# Patient Record
Sex: MALE | Race: WHITE | NOT HISPANIC OR LATINO
[De-identification: names, ages, dates, MRNs, and addresses within clinical notes are randomized per-mention and may not be internally consistent; named-entity substitution may affect disease eponyms.]

---

## 2018-07-06 ENCOUNTER — APPOINTMENT (OUTPATIENT)
Dept: PLASTIC SURGERY | Facility: CLINIC | Age: 43
End: 2018-07-06
Payer: COMMERCIAL

## 2018-07-06 VITALS
OXYGEN SATURATION: 98 % | DIASTOLIC BLOOD PRESSURE: 59 MMHG | SYSTOLIC BLOOD PRESSURE: 99 MMHG | TEMPERATURE: 97.9 F | BODY MASS INDEX: 34.65 KG/M2 | WEIGHT: 242 LBS | HEART RATE: 62 BPM | HEIGHT: 70 IN | RESPIRATION RATE: 20 BRPM

## 2018-07-06 DIAGNOSIS — F17.200 NICOTINE DEPENDENCE, UNSPECIFIED, UNCOMPLICATED: ICD-10-CM

## 2018-07-06 PROCEDURE — 99204 OFFICE O/P NEW MOD 45 MIN: CPT

## 2018-08-28 ENCOUNTER — APPOINTMENT (OUTPATIENT)
Dept: PLASTIC SURGERY | Facility: HOSPITAL | Age: 43
End: 2018-08-28
Payer: COMMERCIAL

## 2018-08-28 PROCEDURE — 15830 EXC EXCESSIVE SKIN ABDOMEN: CPT

## 2018-09-05 ENCOUNTER — APPOINTMENT (OUTPATIENT)
Dept: PLASTIC SURGERY | Facility: CLINIC | Age: 43
End: 2018-09-05
Payer: COMMERCIAL

## 2018-09-05 VITALS
RESPIRATION RATE: 20 BRPM | TEMPERATURE: 98.9 F | WEIGHT: 227 LBS | HEIGHT: 70 IN | OXYGEN SATURATION: 98 % | BODY MASS INDEX: 32.5 KG/M2 | DIASTOLIC BLOOD PRESSURE: 64 MMHG | SYSTOLIC BLOOD PRESSURE: 100 MMHG | HEART RATE: 93 BPM

## 2018-09-05 DIAGNOSIS — Z09 ENCOUNTER FOR FOLLOW-UP EXAMINATION AFTER COMPLETED TREATMENT FOR CONDITIONS OTHER THAN MALIGNANT NEOPLASM: ICD-10-CM

## 2018-09-05 PROCEDURE — 99024 POSTOP FOLLOW-UP VISIT: CPT

## 2018-09-05 RX ORDER — OXYCODONE HYDROCHLORIDE 5 MG/1
5 CAPSULE ORAL
Qty: 30 | Refills: 0 | Status: ACTIVE | COMMUNITY
Start: 2018-09-05 | End: 1900-01-01

## 2018-09-10 PROBLEM — Z09 POSTOP CHECK: Status: ACTIVE | Noted: 2018-09-10

## 2018-09-12 ENCOUNTER — APPOINTMENT (OUTPATIENT)
Dept: PLASTIC SURGERY | Facility: CLINIC | Age: 43
End: 2018-09-12
Payer: COMMERCIAL

## 2018-09-12 VITALS
SYSTOLIC BLOOD PRESSURE: 111 MMHG | TEMPERATURE: 98 F | OXYGEN SATURATION: 96 % | HEART RATE: 74 BPM | RESPIRATION RATE: 20 BRPM | DIASTOLIC BLOOD PRESSURE: 70 MMHG

## 2018-09-12 VITALS — TEMPERATURE: 98.2 F

## 2018-09-12 PROCEDURE — 99024 POSTOP FOLLOW-UP VISIT: CPT

## 2018-09-12 RX ORDER — OXYCODONE HYDROCHLORIDE 5 MG/1
5 CAPSULE ORAL EVERY 4 HOURS
Qty: 50 | Refills: 0 | Status: ACTIVE | COMMUNITY
Start: 2018-09-12 | End: 1900-01-01

## 2018-09-12 RX ORDER — SILVER SULFADIAZINE 10 MG/G
1 CREAM TOPICAL DAILY
Qty: 1 | Refills: 0 | Status: ACTIVE | COMMUNITY
Start: 2018-09-12 | End: 1900-01-01

## 2018-09-20 ENCOUNTER — APPOINTMENT (OUTPATIENT)
Dept: PLASTIC SURGERY | Facility: CLINIC | Age: 43
End: 2018-09-20
Payer: COMMERCIAL

## 2018-09-20 VITALS — HEART RATE: 63 BPM | TEMPERATURE: 98.5 F | RESPIRATION RATE: 20 BRPM | OXYGEN SATURATION: 97 %

## 2018-09-20 PROCEDURE — 99024 POSTOP FOLLOW-UP VISIT: CPT

## 2018-09-27 ENCOUNTER — APPOINTMENT (OUTPATIENT)
Dept: PLASTIC SURGERY | Facility: CLINIC | Age: 43
End: 2018-09-27
Payer: COMMERCIAL

## 2018-09-27 VITALS — TEMPERATURE: 98.4 F

## 2018-09-27 PROCEDURE — 99024 POSTOP FOLLOW-UP VISIT: CPT

## 2018-09-27 RX ORDER — SILVER SULFADIAZINE 10 MG/G
1 CREAM TOPICAL TWICE DAILY
Qty: 1 | Refills: 2 | Status: ACTIVE | COMMUNITY
Start: 2018-09-27 | End: 1900-01-01

## 2018-10-17 ENCOUNTER — APPOINTMENT (OUTPATIENT)
Dept: PLASTIC SURGERY | Facility: CLINIC | Age: 43
End: 2018-10-17

## 2019-01-23 ENCOUNTER — APPOINTMENT (OUTPATIENT)
Dept: PLASTIC SURGERY | Facility: CLINIC | Age: 44
End: 2019-01-23

## 2019-04-03 ENCOUNTER — APPOINTMENT (OUTPATIENT)
Dept: PLASTIC SURGERY | Facility: CLINIC | Age: 44
End: 2019-04-03
Payer: COMMERCIAL

## 2019-04-03 VITALS
SYSTOLIC BLOOD PRESSURE: 115 MMHG | TEMPERATURE: 98.7 F | HEIGHT: 69 IN | BODY MASS INDEX: 34.51 KG/M2 | RESPIRATION RATE: 20 BRPM | DIASTOLIC BLOOD PRESSURE: 74 MMHG | WEIGHT: 233 LBS | HEART RATE: 72 BPM | OXYGEN SATURATION: 97 %

## 2019-04-03 DIAGNOSIS — M79.3 PANNICULITIS, UNSPECIFIED: ICD-10-CM

## 2019-04-03 DIAGNOSIS — Z41.1 ENCOUNTER FOR COSMETIC SURGERY: ICD-10-CM

## 2019-04-03 PROCEDURE — 99213 OFFICE O/P EST LOW 20 MIN: CPT

## 2019-04-03 NOTE — PHYSICAL EXAM
[NI] : Normal [de-identified] : Excess breast tissue and skin with ptosis extending laterally out towards his back  [de-identified] : Shape and contour are good\par scars healing well\par flaps viable with no palpable collection  [de-identified] : Excess skin and tissues in the upper arms

## 2019-04-03 NOTE — ASSESSMENT
[FreeTextEntry1] : A:\par Panniculitis\par excess breast skin and tissue\par bat wing deformity upper arms \par P:\par Doing well following Panniculectomy\par We discussed possible breast reduction with Torres pattern of extensive scars and Brachioplasty with visible scar down the arm.  Reviewed the material risks,  benefits,  and alternatives with Mr. MARINA LATHAM  , including no surgery, and he  understands and wishes to proceed.\par \par

## 2019-04-03 NOTE — REASON FOR VISIT
[FreeTextEntry1] : Mr. MARINA LATHAM returns for a follow up visit, generally doing well with no complaints and no fevers or chills at home. He is very pleased with his surgery.

## 2019-04-03 NOTE — HISTORY OF PRESENT ILLNESS
[FreeTextEntry1] : patient with massive weight loss now status post Panniculectomy for functional improvement.  He is bothered by excess skin and ptosis in the breasts and bat wing deformity in the upper arms and is interested in surgical contouring.

## 2021-01-25 ENCOUNTER — APPOINTMENT (OUTPATIENT)
Dept: PLASTIC SURGERY | Facility: CLINIC | Age: 46
End: 2021-01-25
Payer: COMMERCIAL

## 2021-01-25 VITALS
SYSTOLIC BLOOD PRESSURE: 115 MMHG | BODY MASS INDEX: 31.78 KG/M2 | RESPIRATION RATE: 18 BRPM | DIASTOLIC BLOOD PRESSURE: 69 MMHG | WEIGHT: 222 LBS | HEART RATE: 65 BPM | TEMPERATURE: 98.5 F | HEIGHT: 70 IN | OXYGEN SATURATION: 99 %

## 2021-01-25 DIAGNOSIS — N62 HYPERTROPHY OF BREAST: ICD-10-CM

## 2021-01-25 DIAGNOSIS — L98.7 EXCESSIVE AND REDUNDANT SKIN AND SUBCUTANEOUS TISSUE: ICD-10-CM

## 2021-01-25 PROCEDURE — 99212 OFFICE O/P EST SF 10 MIN: CPT

## 2021-01-25 NOTE — PHYSICAL EXAM
[NI] : Normal [de-identified] : Bilateral grade 3 ptosis with skin excess extending around towards his back\par No palpable masses and no adenopathy  [de-identified] : SHape and contour are good\par well healed surgical scar  [de-identified] : Bilateral upper arm skin excess "bat wing"

## 2021-01-25 NOTE — HISTORY OF PRESENT ILLNESS
[FreeTextEntry1] : Last seen almost two years ago following Panniculectomy with no interval changes in his health.  Smokes "occasionally" and advised to stop as soon as possible. Weight has been stady around 220 plus or minus five pounds.

## 2021-01-25 NOTE — REASON FOR VISIT
[Follow-Up: _____] : a [unfilled] follow-up visit [FreeTextEntry1] : Patient returns with bilateral gynecomastia and excess skin upper arms following massive weight loss

## 2021-01-25 NOTE — ASSESSMENT
[FreeTextEntry1] : A:\par Bilateral gynecomastia following massive weight loss\par Bilateral upper arm skin excess \par P:\par We discussed the treatment options at some length and favor Bilateral breast reduction, wise pattern, extending towards the back, and Bilateral upper arm Brachioplasty.  Surgery could be done together or as two separate procedures.  Reviewed the material risks,  benefits,  and alternatives with Mr. MARINA LATHAM  , including no surgery, and he  understands and wishes to proceed.\par

## 2023-03-07 ENCOUNTER — APPOINTMENT (OUTPATIENT)
Dept: NEUROSURGERY | Facility: CLINIC | Age: 48
End: 2023-03-07
Payer: COMMERCIAL

## 2023-03-07 DIAGNOSIS — E23.7 DISORDER OF PITUITARY GLAND, UNSPECIFIED: ICD-10-CM

## 2023-03-07 PROCEDURE — 99205 OFFICE O/P NEW HI 60 MIN: CPT
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
## 2023-03-07 NOTE — ASSESSMENT
[FreeTextEntry1] : Mr. Smith presents with MRI pituitary 2/3/23 reviewed independently by me demonstrates an approximately 2.1 cm heterogenously hypoenhancing sellar mass lesion eccentric to the left with invasion of the left cavernous sinus and partial encasement of the cavernous segment of the left internal carotid artery without lumina stenosis. There is evidence of pituitary stalk deviation to the far right lateral aspect of the sella and depression of the sellar floor without direct invasion into sphenoid sinus. There is no evidence of suprasellar extension of the lesion or anatomic embarrassment of the optic apparatus. 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 CT head 9/8/20 demonstrated an expanded sella turcica with imaging findings suspicious for a pituitary tumor at that time. Given the difference in modalities, an accurate assessment for interval growth cannot readily be made. \par \par Natural history discussed. Alternative management strategies reviewed. Given the incidental nature of this finding, the lack of anatomic compression of the optic chiasm, and the absence of visual symptoms, I do not recommend neurosurgical intervention at this time. We will obtain baseline endocrinologic labs in preparation for his visit with Dr. Taylor to assess for secretory behavior and for hypopituitarism. In addition, we have referred the patient to Dr. Familia Reeves for a baseline neuro-ophthalmologic evaluation to include Jung visual ansari. In the absence of evidence for secretory behavior, I have recommended surveillance MRI pituitary with and without gadolinium in 6 months with an appointment to follow. \par \par I counseled the patient to follow up with Dr. Chhaya Taylor for a comprehensive endocrinologic evaluation.\par \par I have asked the patient to contact me for any symptomatic development or progression in the interim at which time we can obtain expedited follow up imaging. \par \par A total of 60 minutes were spent relative to this encounter.\par \par

## 2023-03-07 NOTE — DATA REVIEWED
[de-identified] : CT HEAD WITHOUT CONTRAST: 9/8/20: IMPRESSION- An enlarged sella turcica is present with prominent pituitary tissue within it [de-identified] : MRI PITUITARY WITH AND WITHOUT CONTRAST: 2/2/23: IMPRESSION- Expanded sella with large lesion eccentric towards the left invading the left \par cavernous sinus at least partially encasing the cavernous left ICA without \par associated luminal stenosis. Primary differential consideration is a pituitary \par macroadenoma.  \par 2. Contrast enhanced MRI brain is otherwise grossly unremarkable.

## 2023-05-12 ENCOUNTER — APPOINTMENT (OUTPATIENT)
Dept: BARIATRICS | Facility: CLINIC | Age: 48
End: 2023-05-12
Payer: COMMERCIAL

## 2023-05-12 PROCEDURE — 97803 MED NUTRITION INDIV SUBSEQ: CPT | Mod: 95

## 2023-12-09 ENCOUNTER — OFFICE (OUTPATIENT)
Dept: URBAN - METROPOLITAN AREA CLINIC 29 | Facility: CLINIC | Age: 48
Setting detail: OPHTHALMOLOGY
End: 2023-12-09
Payer: COMMERCIAL

## 2023-12-09 DIAGNOSIS — D21.0: ICD-10-CM

## 2023-12-09 PROBLEM — H02.413 PTOSIS, MECHANICAL; BOTH EYES: Status: ACTIVE | Noted: 2023-12-09

## 2023-12-09 PROBLEM — D35.2 PITUITARY TUMOR BENIGN: Status: ACTIVE | Noted: 2023-12-09

## 2023-12-09 PROCEDURE — 92002 INTRM OPH EXAM NEW PATIENT: CPT | Performed by: OPHTHALMOLOGY

## 2023-12-09 ASSESSMENT — CONFRONTATIONAL VISUAL FIELD TEST (CVF)
OD_FINDINGS: FULL
OS_FINDINGS: FULL

## 2023-12-09 ASSESSMENT — LID POSITION - PTOSIS
OS_PTOSIS: LUL 3+
OD_PTOSIS: RUL 3+

## 2024-02-05 ENCOUNTER — RESULT REVIEW (OUTPATIENT)
Age: 49
End: 2024-02-05

## 2024-02-15 ENCOUNTER — NON-APPOINTMENT (OUTPATIENT)
Age: 49
End: 2024-02-15

## 2024-02-27 ENCOUNTER — APPOINTMENT (OUTPATIENT)
Dept: NEUROSURGERY | Facility: CLINIC | Age: 49
End: 2024-02-27
Payer: MEDICAID

## 2024-02-27 PROCEDURE — G2211 COMPLEX E/M VISIT ADD ON: CPT | Mod: NC,1L

## 2024-02-27 PROCEDURE — 99215 OFFICE O/P EST HI 40 MIN: CPT

## 2024-02-27 NOTE — DATA REVIEWED
[de-identified] : [facility]                                          [address] [place] [address]                                             [phone]  PATIENT NAME:           MARINA LATHAM                      YOB: 1975 ORDERING MD:           Blessing Jensen NP  PRIMARY CARE MD:           ABRAN PRIMARY CARE PHYSICIAN                      ATTENDING MD:           Blessing Kamara NP MEDICAL REC#:           [id number]                       ACCOUNT#:             [id number] LOCATION:             [facility]                            ORDER DATE/TIME:           02/05/24 1348 PROCEDURE:            MRI BRAIN [facility] IC  ORDER #:              [id number] CC:                                         ;                     ;                     ; End of cc's  CLINICAL INDICATION: PITUITARY LESION ,E23.7.  TECHNIQUE: Multi-planar multi-sequential MR imaging of the pituitary was performed before and after the intravenous administration of contrast, including dynamic imaging. IV Contrast: Gadavist  `10 cc administered  COMPARISON: None  FINDINGS:  There is a cystic hypoenhancing sellar mass measuring approximately 2.1 x 2 x 1.3 cm (AP by TV by CC) within the left aspect of the sella. There is far rightward deviation of the pituitary tissue and the pituitary stalk along the right lateral wall of the sella. The left sellar mass appears to partially encase the left common carotid artery. There is no suprasellar extension. The optic chiasm appears to be partially tented inferiorly in the suprasellar region.  No acute infarction or intracranial hemorrhage. There is no abnormal intraparenchymal enhancement otherwise.  The ventricles are normal without evidence of hydrocephalus. There are no extra-axial fluid collections.  The visualized intraorbital contents are normal. The imaged portions of the paranasal sinuses are clear. The mastoid air cells are clear. The visualized soft tissues and other osseous structures appear normal.  IMPRESSION:  Sellar mass described above, concerning for pituitary macroadenoma.  --- End of Report ---           Electronically Signed:          ____________________________________________          Akash Casas MD          02/08/24 1136

## 2024-02-27 NOTE — PHYSICAL EXAM
[FreeTextEntry1] : Constitutional: alert, in no acute distress and well nourished. Psychiatric: oriented to person, place, and time, insight and judgment were intact and the affect was normal.   Cranial Nerves: visual acuity intact bilaterally, pupils equal round and reactive to light, extraocular motion intact, facial sensation intact symmetrically, face symmetrical, hearing was intact bilaterally, tongue and palate midline, head turning and shoulder shrug symmetric and there was no tongue deviation with protrusion.   Motor: muscle tone was normal in all four extremities and muscle strength was normal in all four extremities.   Sensory exam: light touch was intact.   Coordination:. normal gait. balance was intact.

## 2024-02-27 NOTE — ASSESSMENT
[FreeTextEntry1] : Mr. Smith presents for continued follow up for pituitary macroadenoma.  MRI pituitary 2/2024 reviewed independently by me demonstrates an approximately 2.1 cm heterogenously hypoenhancing sellar mass lesion eccentric to the left with invasion of the left cavernous sinus and partial encasement of the cavernous segment of the left internal carotid artery without luminal stenosis. There is evidence of pituitary stalk deviation to the far right lateral aspect of the sella and depression of the sellar floor without direct invasion into sphenoid sinus. There is no evidence of suprasellar extension of the lesion or anatomic embarrassment of the optic apparatus. 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 Upon independent review compared to 2023, the lesion is stable and possibly modestly DECREASED in size.  Natural history discussed. Alternative management strategies reviewed.  I have recommended surveillance MRI pituitary with and without gadolinium in 1 year with an appointment to follow.  I encouraged him to obtain regular endocrinological and neuro-ophthalmological follow up.   I have asked the patient to contact me for any symptomatic development or progression in the interim at which time we can obtain expedited follow up imaging.  A total of 60 minutes were spent relative to this encounter.

## 2024-02-27 NOTE — HISTORY OF PRESENT ILLNESS
[FreeTextEntry1] : Mr. Ness presents for neurosurgical follow up.  Previous note and interval history reviewed.  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  Was initially started on cabergoline last year 0.5mg twice per week under the direction of his endocrinologist, Dr. Chhaya Taylor.  Initially with good response to treatment; prolactin level improved from >350 to 2.  Recent lab analysis demonstrated prolactin increased to >200, and cabergoline dosing has since increased to 0.5mg three times per week.  He continues with testosterone replacement therapy as well.  He obtained consultation with neuro-ophthalmology as recommended last year, demonstrating no visual field loss.  Presents today for continued neurosurgical follow up and imaging review, detailed below.  Mr. Ness is tolerating cabergoline well without side effects.  He does feel his peripheral vision has worsened a little.  Otherwise denies headaches, gait instability, extremity numbness/weakness.

## 2025-01-11 ENCOUNTER — NON-APPOINTMENT (OUTPATIENT)
Age: 50
End: 2025-01-11

## 2025-01-23 ENCOUNTER — NON-APPOINTMENT (OUTPATIENT)
Age: 50
End: 2025-01-23